# Patient Record
Sex: FEMALE | URBAN - METROPOLITAN AREA
[De-identification: names, ages, dates, MRNs, and addresses within clinical notes are randomized per-mention and may not be internally consistent; named-entity substitution may affect disease eponyms.]

---

## 2017-11-10 ENCOUNTER — MEDICAL CORRESPONDENCE (OUTPATIENT)
Dept: HEALTH INFORMATION MANAGEMENT | Facility: CLINIC | Age: 47
End: 2017-11-10

## 2017-11-10 ENCOUNTER — TRANSFERRED RECORDS (OUTPATIENT)
Dept: HEALTH INFORMATION MANAGEMENT | Facility: CLINIC | Age: 47
End: 2017-11-10

## 2017-11-16 ENCOUNTER — OFFICE VISIT (OUTPATIENT)
Dept: OBGYN | Facility: CLINIC | Age: 47
End: 2017-11-16
Attending: NURSE PRACTITIONER
Payer: COMMERCIAL

## 2017-11-16 VITALS
HEIGHT: 67 IN | WEIGHT: 145 LBS | HEART RATE: 66 BPM | BODY MASS INDEX: 22.76 KG/M2 | DIASTOLIC BLOOD PRESSURE: 68 MMHG | SYSTOLIC BLOOD PRESSURE: 102 MMHG

## 2017-11-16 DIAGNOSIS — F43.20 ADJUSTMENT DISORDER, UNSPECIFIED TYPE: ICD-10-CM

## 2017-11-16 DIAGNOSIS — E28.39 PREMATURE OVARIAN FAILURE: ICD-10-CM

## 2017-11-16 DIAGNOSIS — Z79.890 HORMONE REPLACEMENT THERAPY: ICD-10-CM

## 2017-11-16 DIAGNOSIS — Z76.89 ENCOUNTER TO ESTABLISH CARE WITH NEW DOCTOR: ICD-10-CM

## 2017-11-16 DIAGNOSIS — E28.39 PREMATURE OVARIAN FAILURE: Primary | ICD-10-CM

## 2017-11-16 LAB
ESTRADIOL SERPL-MCNC: <11 PG/ML
PROGEST SERPL-MCNC: 0.3 NG/ML
TSH SERPL DL<=0.005 MIU/L-ACNC: 1.18 MU/L (ref 0.4–4)

## 2017-11-16 PROCEDURE — 84144 ASSAY OF PROGESTERONE: CPT | Performed by: NURSE PRACTITIONER

## 2017-11-16 PROCEDURE — 36415 COLL VENOUS BLD VENIPUNCTURE: CPT | Performed by: NURSE PRACTITIONER

## 2017-11-16 PROCEDURE — 82670 ASSAY OF TOTAL ESTRADIOL: CPT | Performed by: NURSE PRACTITIONER

## 2017-11-16 PROCEDURE — 84443 ASSAY THYROID STIM HORMONE: CPT | Performed by: NURSE PRACTITIONER

## 2017-11-16 PROCEDURE — 99213 OFFICE O/P EST LOW 20 MIN: CPT | Mod: ZF

## 2017-11-16 RX ORDER — CHLORAL HYDRATE 500 MG
1 CAPSULE ORAL DAILY
COMMUNITY

## 2017-11-16 RX ORDER — ACETAMINOPHEN 160 MG
TABLET,DISINTEGRATING ORAL
COMMUNITY

## 2017-11-16 RX ORDER — MULTIVIT WITH MINERALS/LUTEIN
1000 TABLET ORAL DAILY
COMMUNITY

## 2017-11-16 RX ORDER — ESTRADIOL 10 UG/1
10 INSERT VAGINAL
Qty: 8 TABLET | Refills: 3 | Status: SHIPPED | OUTPATIENT
Start: 2017-11-16

## 2017-11-16 ASSESSMENT — PAIN SCALES - GENERAL: PAINLEVEL: NO PAIN (0)

## 2017-11-16 NOTE — PATIENT INSTRUCTIONS

## 2017-11-16 NOTE — LETTER
2017       RE: Radha Kebede  [address] [facility] [facility] [address] [facility] [address]     Dear Colleague,    Thank you for referring your patient, Radha Kebede, to the [facility] at [facility]. Please see a copy of my visit note below.        Progress Note    SUBJECTIVE:  Radha Kebede is a 47 year old, , with PMHx of premature ovarian failure who presents to [facility] health care.    Last women's health annual exam 10/17, [place]. PCP: Dr Magallanes  Last pap: 2.5 years ago per pt report. No hx of abnormal paps.    Last mammogram: first mammogram scheduled today at [place]  Last DXA scan: ~8 years ago per pt report. No hx of abnormal results.    Concerns today include:   1. Hx of premature ovarian failure at age 29. Left oopherectomy in  for hemorrhagic cyst. Right ovary congenitally nonfunctional. Denies post-menopausal bleeding.    2. Hormone replacement therapy. Pt is on compounded progesterone 100mg/ estriol 4mg which she tolerates well. She has not taken this for 2 months because the Rx ran out, and she feels well despite vaginal dryness that causes painful intercourse. Denies VMS. Pt is requesting for her hormone levels to be checked prior to restarting HT.     3. Interested in integrative therapies for PTSD from son's attempted suicide. In 2017, pt's 15 year old son hung himself, and pt found him and resuscitated him. He is alive and well now; no residual problems. Pt is coping well since the event. She uses horse therapy, and she and her  have sought therapy together which has been very helpful.    Menstrual History:  LMP: Post-menopausal. Denies vaginal bleeding.    Social:  to , supportive. Self-employed as . Has 2 adopted sons, age 15 and 16.  Exercises 5 days per week horseback riding x 60 min.  Nonsmoker.  ETOH: 2 drinks monthly.  No drug use.    Last    Lab Results   Component  Value Date    PAP NIL 2009     History of abnormal Pap smear: NO - age 30-65 PAP every 5 years with negative HPV co-testing recommended    Last No results found for: HPV16  Last No results found for: HPV18  Last No results found for: HRHPV    Mammogram current: pt has first mammogram scheduled for today.    Last Colonoscopy: Never    HISTORY:    No current outpatient prescriptions on file prior to visit.  No current facility-administered medications on file prior to visit.   Allergies   Allergen Reactions     Amoxicillin Other (See Comments)     Optic neuritis     Gluten Meal      Milk Protein Extract      Soy [Isoflavones]        There is no immunization history on file for this patient.    Obstetric History       T0      L0     SAB0   TAB0   Ectopic0   Multiple0   Live Births0      Past Medical History:   Diagnosis Date     In vitro fertilization [id number]    unsuccessful     Optic neuritis      Posttraumatic stress disorder      Premature ovarian failure      Vitamin D deficiency      Past Surgical History:   Procedure Laterality Date     HC REMOVAL OF OVARIAN CYST(S)       OOPHORECTOMY Left 2000     Family History   Problem Relation Age of Onset     Myocardial Infarction Maternal Grandfather 60     Coronary Artery Disease Maternal Grandfather      HEART DISEASE Father      Mitral valve repair     Arthritis Brother      Suicide Maternal Grandmother      Hypertension Paternal Grandmother      Emphysema Paternal Grandfather      Social History     Social History     Marital status:      Spouse name: N/A     Number of children: N/A     Years of education: N/A     Social History Main Topics     Smoking status: Not on file     Smokeless tobacco: Not on file     Alcohol use Not on file     Drug use: Not on file     Sexual activity: Not on file     Other Topics Concern     Not on file     Social History Narrative     No narrative on file       Review of Systems     Genitourinary:  Positive  "for vaginal dryness.   All other systems reviewed and are negative.      EXAM:  Blood pressure 102/68, pulse 66, height 1.702 m (5' 7\"), weight 65.8 kg (145 lb). Body mass index is 22.71 kg/(m^2).  General - pleasant female in no acute distress.  Skin - no suspicious lesions or rashes  EENT-  PERRLA, euthyroid with out palpable nodules  Neck - supple without lymphadenopathy.  Lungs - clear to auscultation bilaterally.  Heart - regular rate and rhythm without murmur.  Abdomen - soft, nontender, nondistended, no masses or organomegaly noted.  Musculoskeletal - no gross deformities.  Neurological - normal strength, sensation, and mental status.    ASSESSMENT:  Encounter Diagnoses   Name Primary?     Premature ovarian failure Yes     Hormone replacement therapy        PLAN:   Orders Placed This Encounter   Procedures     Estradiol     Progesterone     TSH - Reflex to FT4     Orders Placed This Encounter   Medications     COMPOUND CONTAINING CONTROLLED SUBSTANCE (CMPD RX) - PHARMACY TO MIX COMPOUNDED MEDICATION     Si capsule daily Progesterone 100 mg, estriol 4 MG      Additional teaching done at this visit regarding mental health.    Results via [employer].   Vaginal dryness: Vagifem rx sent. Discussed lubricants, referred to Elisa Crum, and Midlife education folder reviewed.  Continue compounded Rx as prescribed by Dr. Magallanes. Re-evaluate in 2-3 months,. Advised recommendations to continue HT until age 50-52  Discussed Reiki therapy to help release \"retained body trauma\" from son's suicide attempt,  given referral to  Noelle Torres.  RTC 2018 for 40 minute visit annual exam. Will address recommendations for DEXA scan    I, ALEKSEY Fraga, RN, CARSON Student, completed the PFSH and ROS. I then acted as a scribe for Lynette BYRD for the remainder of the visit.    ALEKSEY Fraga, RN  DNP student/ CARSON specialty    \"I agree with the PFSH and ROS as completed by the CARSON Student except for " changes made by me. The remainder of the encounter was performed by me and scribed by ALEKSEY FragaN, RN, WHNP Student. The scribed note accurately reflects my personal services and decisions made by me      Again, thank you for allowing me to participate in the care of your patient.      Sincerely,    PATRICIA Bazzi CNP

## 2017-11-16 NOTE — MR AVS SNAPSHOT
After Visit Summary   11/16/2017    Radha Kebede    MRN: [id number]           Patient Information     Date Of Birth          1970        Visit Information        Provider Department      11/16/2017 8:40 AM Lynette Infante APRN CNP [facility]        Today's Diagnoses     Premature ovarian failure    -  1    Hormone replacement therapy          Care Instructions      PREVENTIVE HEALTH RECOMMENDATIONS:   Most women need a yearly breast and pelvic exam.    A PAP screen, a test done DURING a pelvic exam, is NO longer recommended yearly.    March 2013, screening guidelines recommended by ACOG for PAP screen are:    1) First pap at age 21.    2) Pap every 3 years until age 30.    3) After age 30, pap every 3 years or Pap with HR HPV screen every 5 years until age 65.  4) Women do NOT need a vaginal Pap screen after a hysterectomy (surgical removal of the uterus) when they have not had cancer.    Exceptions:  1) Yearly pap if HIV+ or immunosuppressed secondary to organ transplant  2) ARNOLDO II-III continue routine screening for 20 years.    I encourage you continue looking for opportunities to choose a healthy lifestyle:       * Choose to eat a heart healthy diet. Check out the FOOD PLATE guidelines at: http://www.choosemyplate.gov/ for helpful hints on weight and cholesterol management.  Balance your caloric intake with exercise to maintain a BMI in the 22 to 26 range. For bone health: Eat calcium-rich foods like yogurt, broccoli or take chewable calcium pills (500 to 600 mg) twice a day with food.       * Exercise for at least an average of 30 minutes a day, 5 days of the week. This will help you control your weight, release stress, and help prevent disease.      * Take a Vitamin D3 supplement daily fall through spring and during summer unless you apag45-41' full body sun exposure to skin without sunscreen.      * DO wear sunscreen to prevent skin cancer after the first 15-30  minutes.      * Identify stressors in your life, find ways to release the stress, and, make time for yourself. PLEASE ask for help if mood changes last longer than two weeks.     * Limit alcohol to one drink per day.  No smoking.  Avoid second hand smoke. If you smoke, ask for help to stop.       *  If you are in a sexual relationship, talk with your partner about possible infection risks and take action to protect yourself from exposure to a sexual infection.    Please request an infection screen for STIs (sexually transmitted infections) if you are less than age 26 OR believe that you may be at risk.     Get a flu shot each year. Get a tetanus shot every 10 years. EVERYONE needs a pertussis (Whooping cough) booster.    See your dentist twice a year for an exam and preventive care cleaning.     Consider the following screen tests:    1) cholesterol test every 5 years.     2) yearly mammogram after age 40 unless you have identified risks.    3) colonoscopy every 10 years after age 50 unless you have identified risks.    4) diabetes blood test screening if you are at risk for diabetes.      Additional information that you may also find helpful:  The Internet now gives us access to LOTS of information -- some of it helpful, research documented and also plenty of harmful, anecdotal information that may not pertain to your situtaion. Consider visiting the following websites for accurate health information:    www.vitamindcouncil.org/ : Info and ongoing research re Vitamin D    www.fairview.org : Up to date and easily searchable information on multiple topics.    www.medlineplus.gov : medication info, interactive tutorials, watch real surgeries online    www.cdc.gov : public health info, travel advisories, epidemics (H1N1)    www.marcela/std.org: current research re diagnosis, treatment and prevention of sexually contacted infections.    www.health.[facility].[place] : [place] dept of heatlh, public health issues in [place],  N1N1    www.familydoctor.org : good info from the Academy of Family Physicians    Go to lab for blood work. Results will be communicated via Sedimap.   Continue compunded Rx as prescribed by Dr. Magallanes.   Body work to release trauma with Noelle Torres: Call [phone] to schedule.  Return to clinic in 2018 for 40 minute visit annual exam.                 Follow-ups after your visit        Follow-up notes from your care team     Return in 1 year (on 2018) for Preventative Health Visit.      Future tests that were ordered for you today     Open Future Orders        Priority Expected Expires Ordered    Estradiol Routine  2018    Progesterone Routine  2018    TSH - Reflex to FT4 Routine  2018            Who to contact     Please call your clinic at [phone] to:    Ask questions about your health    Make or cancel appointments    Discuss your medicines    Learn about your test results    Speak to your doctor   If you have compliments or concerns about an experience at your clinic, or if you wish to file a complaint, please contact [facility] Patient Relations at [phone] or email us at [email or web address]         Additional Information About Your Visit        Spot formerly PlacePopharDetectent Information     Sedimap is an electronic gateway that provides easy, online access to your medical records. With Sedimap, you can request a clinic appointment, read your test results, renew a prescription or communicate with your care team.     To sign up for Wantfult visit the website at [email or web address]   You will be asked to enter the access code listed below, as well as some personal information. Please follow the directions to create your username and password.     Your access code is: [email or web address]  Expires: 2018  6:31 AM     Your access code will  in 90 days. If you need help or a new code, please contact your  "[facility] or call [phone] for assistance.        Care EveryWhere ID     This is your Care EveryWhere ID. This could be used by other organizations to access your [facility] medical records  [id number]        Your Vitals Were     Pulse Height BMI (Body Mass Index)             66 1.702 m (5' 7\") 22.71 kg/m2          Blood Pressure from Last 3 Encounters:   11/16/17 102/68    Weight from Last 3 Encounters:   11/16/17 65.8 kg (145 lb)               Primary Care Provider    None Specified       No primary provider on file.        Equal Access to Services     [facility]: Hadii meagan altman hadasho Soomaali, waaxda luqadaha, qaybta kaalmada thalia, willa miller . So [facility] [phone].    ATENCIÓN: Si habla español, tiene a cameron disposición servicios gratuitos de asistencia lingüística. Llame al [phone].    We comply with applicable federal civil rights laws and [place] laws. We do not discriminate on the basis of race, color, national origin, age, disability, sex, sexual orientation, or gender identity.            Thank you!     Thank you for choosing [facility]  for your care. Our goal is always to provide you with excellent care. Hearing back from our patients is one way we can continue to improve our services. Please take a few minutes to complete the written survey that you may receive in the mail after your visit with us. Thank you!             Your Updated Medication List - Protect others around you: Learn how to safely use, store and throw away your medicines at www.disposemymeds.org.          This list is accurate as of: 11/16/17 10:01 AM.  Always use your most recent med list.                   Brand Name Dispense Instructions for use Diagnosis    COMPOUND CONTAINING CONTROLLED SUBSTANCE - PHARMACY TO MIX COMPOUNDED MEDICATION    CMPD RX     1 capsule daily Progesterone 100 mg, estriol 4 MG          "

## 2017-11-16 NOTE — PROGRESS NOTES
Progress Note    SUBJECTIVE:  Radha Kebede is a 47 year old, , with PMHx of premature ovarian failure who presents to [facility].    Last women's health annual exam 10/17, [place]. PCP: Dr Magallanes  Last pap: 2.5 years ago per pt report. No hx of abnormal paps.    Last mammogram: first mammogram scheduled today at [place]  Last DXA scan: ~8 years ago per pt report. No hx of abnormal results.    Concerns today include:   1. Hx of premature ovarian failure at age 29. Left oopherectomy in  for hemorrhagic cyst. Right ovary congenitally nonfunctional. Denies post-menopausal bleeding.    2. Hormone replacement therapy. Pt is on compounded progesterone 100mg/ estriol 4mg which she tolerates well. She has not taken this for 2 months because the Rx ran out, and she feels well despite vaginal dryness that causes painful intercourse. Denies VMS. Pt is requesting for her hormone levels to be checked prior to restarting HT.     3. Interested in integrative therapies for PTSD from son's attempted suicide. In 2017, pt's 15 year old son hung himself, and pt found him and resuscitated him. He is alive and well now; no residual problems. Pt is coping well since the event. She uses horse therapy, and she and her  have sought therapy together which has been very helpful.    Menstrual History:  LMP: Post-menopausal. Denies vaginal bleeding.    Social:  to , supportive. Self-employed as . Has 2 adopted sons, age 15 and 16.  Exercises 5 days per week horseback riding x 60 min.  Nonsmoker.  ETOH: 2 drinks monthly.  No drug use.    Last    Lab Results   Component Value Date    PAP NIL 2009     History of abnormal Pap smear: NO - age 30-65 PAP every 5 years with negative HPV co-testing recommended    Last No results found for: HPV16  Last No results found for: HPV18  Last No results found for: HRHPV    Mammogram current: pt has first mammogram  "scheduled for today.    Last Colonoscopy: Never    HISTORY:    No current outpatient prescriptions on file prior to visit.  No current facility-administered medications on file prior to visit.   Allergies   Allergen Reactions     Amoxicillin Other (See Comments)     Optic neuritis     Gluten Meal      Milk Protein Extract      Soy [Isoflavones]        There is no immunization history on file for this patient.    Obstetric History       T0      L0     SAB0   TAB0   Ectopic0   Multiple0   Live Births0      Past Medical History:   Diagnosis Date     In vitro fertilization [id number]    unsuccessful     Optic neuritis      Posttraumatic stress disorder      Premature ovarian failure      Vitamin D deficiency      Past Surgical History:   Procedure Laterality Date     HC REMOVAL OF OVARIAN CYST(S)       OOPHORECTOMY Left 2000     Family History   Problem Relation Age of Onset     Myocardial Infarction Maternal Grandfather 60     Coronary Artery Disease Maternal Grandfather      HEART DISEASE Father      Mitral valve repair     Arthritis Brother      Suicide Maternal Grandmother      Hypertension Paternal Grandmother      Emphysema Paternal Grandfather      Social History     Social History     Marital status:      Spouse name: N/A     Number of children: N/A     Years of education: N/A     Social History Main Topics     Smoking status: Not on file     Smokeless tobacco: Not on file     Alcohol use Not on file     Drug use: Not on file     Sexual activity: Not on file     Other Topics Concern     Not on file     Social History Narrative     No narrative on file       Review of Systems     Genitourinary:  Positive for vaginal dryness.   All other systems reviewed and are negative.      EXAM:  Blood pressure 102/68, pulse 66, height 1.702 m (5' 7\"), weight 65.8 kg (145 lb). Body mass index is 22.71 kg/(m^2).  General - pleasant female in no acute distress.  Skin - no suspicious lesions or " "rashes  EENT-  PERRLA, euthyroid with out palpable nodules  Neck - supple without lymphadenopathy.  Lungs - clear to auscultation bilaterally.  Heart - regular rate and rhythm without murmur.  Abdomen - soft, nontender, nondistended, no masses or organomegaly noted.  Musculoskeletal - no gross deformities.  Neurological - normal strength, sensation, and mental status.    ASSESSMENT:  Encounter Diagnoses   Name Primary?     Premature ovarian failure Yes     Hormone replacement therapy        PLAN:   Orders Placed This Encounter   Procedures     Estradiol     Progesterone     TSH - Reflex to FT4     Orders Placed This Encounter   Medications     COMPOUND CONTAINING CONTROLLED SUBSTANCE (CMPD RX) - PHARMACY TO MIX COMPOUNDED MEDICATION     Si capsule daily Progesterone 100 mg, estriol 4 MG      Additional teaching done at this visit regarding mental health.    Results via [employer].   Vaginal dryness: Vagifem rx sent. Discussed lubricants, referred to Elisa Crum, and Midlife education folder reviewed.  Continue compounded Rx as prescribed by Dr. Magallanes. Re-evaluate in 2-3 months,. Advised recommendations to continue HT until age 50-52  Discussed Reiki therapy to help release \"retained body trauma\" from son's suicide attempt,  given referral to  Noelle Torres.  RTC 2018 for 40 minute visit annual exam. Will address recommendations for DEXA scan    I, ALEKSEY Fraga, RN, CARSON Student, completed the PFSH and ROS. I then acted as a scribe for Lynette BYRD for the remainder of the visit.    ALEKSEY Fraga, RN  DNP student/ CARSON specialty    \"I agree with the PFSH and ROS as completed by the CARSON Student except for changes made by me. The remainder of the encounter was performed by me and scribed by ALEKSEY Fraga, RN, CARSON Student. The scribed note accurately reflects my personal services and decisions made by me    Lynette BYRD      "

## 2017-11-28 NOTE — PROGRESS NOTES
Dear Radha,    Your thyroid test was normal. The hormone results are within expected ranges for HT following premature menopause.    Sincerely,  Lynette BYRD

## 2018-02-21 ENCOUNTER — OFFICE VISIT (OUTPATIENT)
Dept: OBGYN | Facility: CLINIC | Age: 48
End: 2018-02-21
Attending: NURSE PRACTITIONER
Payer: COMMERCIAL

## 2018-02-21 VITALS
WEIGHT: 149 LBS | BODY MASS INDEX: 23.39 KG/M2 | HEART RATE: 71 BPM | HEIGHT: 67 IN | DIASTOLIC BLOOD PRESSURE: 74 MMHG | SYSTOLIC BLOOD PRESSURE: 111 MMHG

## 2018-02-21 DIAGNOSIS — N95.1 SYMPTOMATIC MENOPAUSAL OR FEMALE CLIMACTERIC STATES: Primary | ICD-10-CM

## 2018-02-21 DIAGNOSIS — N95.1 VAGINAL DRYNESS, MENOPAUSAL: ICD-10-CM

## 2018-02-21 ASSESSMENT — ENCOUNTER SYMPTOMS
DYSURIA: 0
FEVER: 0
BLOATING: 0
CHILLS: 0
INSOMNIA: 0
ABDOMINAL PAIN: 0

## 2018-02-21 ASSESSMENT — PAIN SCALES - GENERAL: PAINLEVEL: NO PAIN (0)

## 2018-02-21 NOTE — PROGRESS NOTES
Progress Note    SUBJECTIVE:  Radha Kebede is an 47 year old, , who requests follow up HT. She has been receiving HT through an integrative provider, who is retiring and Dr. Magallanes ([place], [place]). She is wanting to establish care here at [facility] for HT and gynecologic care, but she will continue primary care with Dr. Magallanes. Last gyn exam with Dr Magallanes 1.5 years ago with nl pap per patient.    Radha reports feeling well overall; although has a lot of stress in her life, especially with her 16-year-old son; however, she reports being able to manage it effectively with self-care.    Concerns today include:   1. POI at age 29 for which she is taking 100mg progesterone/4mg estriol once day and is requesting a prescription refill. She denies hot flashes, night sweats, fatigue, and difficulty sleeping. Patient states she is on 4mg of estriol as a neuroprotective treatment due to MS -like symptoms 5 years ago that proved not to be MS. She is seeing a functional medicine MD for this issue. She takes 100mg of progesterone daily.    2. Vaginal dryness that worsened while briefly off HT, but improved upon re-starting HT in 2017. She reports struggling to remember to take this medication twice weekly, would prefer an as needed or once weekly vaginal cream. Radha reports using lubrication as needed for insertional dyspareunia.     Menstrual History:   Denies any post-menstrual vaginal bleeding.   No flowsheet data found.    Last    Lab Results   Component Value Date    PAP NIL 2009     HISTORY:    Current Outpatient Prescriptions on File Prior to Visit:  COMPOUND CONTAINING CONTROLLED SUBSTANCE (CMPD RX) - PHARMACY TO MIX COMPOUNDED MEDICATION 1 capsule daily Progesterone 100 mg, estriol 4 MG    estradiol (VAGIFEM) 10 MCG TABS vaginal tablet Place 1 tablet (10 mcg) vaginally twice a week   fish oil-omega-3 fatty acids 1000 MG capsule Take 1 g by mouth daily Nordic  naturalDHA   Nutritional Supplements (NUTRITIONAL SUPPLEMENT PO) Take 5,000 mg by mouth MTH Folate/ Vitamin B12   Nutritional Supplements (NUTRITIONAL SUPPLEMENT PO) Take 1 capsule by mouth MRF 2   Multiple Vitamins-Minerals (MULTIVITAMIN ADULT PO)    Probiotic Product (PROBIOTIC DAILY PO) Take 1 capsule by mouth daily   Cholecalciferol (VITAMIN D3) 2000 UNITS CAPS    Nutritional Supplements (NUTRITIONAL SUPPLEMENT PO) Take 200 mg by mouth daily S-acetyl Glutathione   MAGNESIUM MALATE PO Take 800 mg by mouth daily   ascorbic acid (VITAMIN C) 1000 MG TABS Take 1,000 mg by mouth daily     No current facility-administered medications on file prior to visit.   Allergies   Allergen Reactions     Amoxicillin Other (See Comments)     Optic neuritis     Gluten Meal      Milk Protein Extract      Soy [Isoflavones]        There is no immunization history on file for this patient.    Obstetric History       T0      L0     SAB0   TAB0   Ectopic0   Multiple0   Live Births0      Past Medical History:   Diagnosis Date     In vitro fertilization [id number]    unsuccessful     Optic neuritis      Posttraumatic stress disorder      Premature ovarian failure      Vitamin D deficiency      Past Surgical History:   Procedure Laterality Date     HC REMOVAL OF OVARIAN CYST(S)       OOPHORECTOMY Left      Family History   Problem Relation Age of Onset     Myocardial Infarction Maternal Grandfather 60     Coronary Artery Disease Maternal Grandfather      HEART DISEASE Father      Mitral valve repair     Arthritis Brother      Suicide Maternal Grandmother      Hypertension Paternal Grandmother      Emphysema Paternal Grandfather      Social History     Social History     Marital status:      Spouse name: N/A     Number of children: N/A     Years of education: N/A     Occupational History           Social History Main Topics     Smoking status: Never Smoker     Smokeless tobacco: Never Used      "Alcohol use No      Comment: 1-2 month      Drug use: No     Sexual activity: Yes     Partners: Male     Other Topics Concern     Not on file     Social History Narrative    How much exercise per week? 4 days leasing a horse     How much calcium per day? Supplement       How much caffeine per day? 2 cups coffee    How much vitamin D per day? supplement    Do you/your family wear seatbelts?  Yes    Do you/your family use safety helmets? Yes    Do you/your family use sunscreen? Sometimes     Do you/your family keep firearms in the home? No    Do you/your family have a smoke detector(s)? Yes        November 16, 2017 Stefanie Caba LPN           Review of Systems     Constitutional:  Negative for fever and chills.   Gastrointestinal:  Negative for abdominal pain, bloating and change in stool.   Genitourinary:  Positive for vaginal dryness. Negative for bladder incontinence, dysuria, urgency and vaginal discharge.       EXAM:  Blood pressure 111/74, pulse 71, height 1.702 m (5' 7\"), weight 67.6 kg (149 lb). There is no height or weight on file to calculate BMI.  Constitutional: Well appearing woman in no acute distress, appropriately groomed   Oriented to time and place, engaged and interactive   Psychological: Appropriate mood  Eyes symmetrical, sclera clear and white, conjunctiva pink and moist   Mouth: moist mucous membranes, no visible dental caries   Skin: warm and dry, no visible rashes or lesions  Neuro: normal gait, no visible tremor   Musculoskeletal: Full ROM, no muscular weakness visible   40 minutes total time was spent in direct contact with patient and > 50% of the time in patient education and coordination of care.     ASSESSMENT:  Encounter Diagnoses   Name Primary?     Symptomatic menopausal or female climacteric states Yes     Vaginal dryness, menopausal         PLAN:     Orders Placed This Encounter   Medications     estradiol (ESTRING) 2 MG vaginal ring     Sig: Place 1 each (2 mg) vaginally every 3 " "months     Dispense:  1 each     Refill:  3     Continue with Rx 4mg estriol/10 mg progesterone orally on a daily basis. Compounded through Dr Magallanes as discussed.  Rx for Estring sent to [facility] to treat vaginal dryness. Patient declines a change in estriol.  Return in the fall for full well woman visit and medication assessment.  Patient to schedule Dexascan through Dr Magallanes soon.    I, Vidhya BYRD Student, completed the PFSH and ROS. I then acted as a scribe for Lynette BYRD for the remainder of the visit.  Vidhya BYRD Student.     \"I agree with the PFSH and ROS as completed by the CARSON Student, Vidhya Blanco  except for changes made by me. The remainder of the encounter was performed by me and scribed by the Vidhya Blanco. The scribed note accurately reflects my personal services and decisions made by me.      Lynette BYRD              "

## 2018-02-21 NOTE — PATIENT INSTRUCTIONS
Continue with Rx through Dr Magallanes as discussed  Rx for Estring sent to [facility]  Return in the fall for full  well woman visit and medication assessment

## 2025-07-15 NOTE — LETTER
2018       RE: Radha Kebede  [address] [address]     Dear Colleague,    Thank you for referring your patient, Radha Kebede, to the [facility] at [facility]. Please see a copy of my visit note below.          Progress Note    SUBJECTIVE:  Radha Kebede is an 47 year old, , who requests follow up HT. She has been receiving HT through an integrative provider, who is retiring and Dr. Magallanes ([place], [place]). She is wanting to establish care here at [facility] for HT and gynecologic care, but she will continue primary care with Dr. Magallanes. Last gyn exam with Dr Magallanes 1.5 years ago with nl pap per patient.    Radha reports feeling well overall; although has a lot of stress in her life, especially with her 16-year-old son; however, she reports being able to manage it effectively with self-care.    Concerns today include:   1. POI at age 29 for which she is taking 100mg progesterone/4mg estriol once day and is requesting a prescription refill. She denies hot flashes, night sweats, fatigue, and difficulty sleeping. Patient states she is on 4mg of estriol as a neuroprotective treatment due to MS -like symptoms 5 years ago that proved not to be MS. She is seeing a functional medicine MD for this issue. She takes 100mg of progesterone daily.    2. Vaginal dryness that worsened while briefly off HT, but improved upon re-starting HT in 2017. She reports struggling to remember to take this medication twice weekly, would prefer an as needed or once weekly vaginal cream. Radha reports using lubrication as needed for insertional dyspareunia.     Menstrual History:   Denies any post-menstrual vaginal bleeding.   No flowsheet data found.    Last    Lab Results   Component Value Date    PAP NIL 2009     HISTORY:    Current Outpatient Prescriptions on File Prior to Visit:  COMPOUND CONTAINING CONTROLLED  SUBSTANCE (CMPD RX) - PHARMACY TO MIX COMPOUNDED MEDICATION 1 capsule daily Progesterone 100 mg, estriol 4 MG    estradiol (VAGIFEM) 10 MCG TABS vaginal tablet Place 1 tablet (10 mcg) vaginally twice a week   fish oil-omega-3 fatty acids 1000 MG capsule Take 1 g by mouth daily Nordic naturalDHA   Nutritional Supplements (NUTRITIONAL SUPPLEMENT PO) Take 5,000 mg by mouth MTH Folate/ Vitamin B12   Nutritional Supplements (NUTRITIONAL SUPPLEMENT PO) Take 1 capsule by mouth MRF 2   Multiple Vitamins-Minerals (MULTIVITAMIN ADULT PO)    Probiotic Product (PROBIOTIC DAILY PO) Take 1 capsule by mouth daily   Cholecalciferol (VITAMIN D3) 2000 UNITS CAPS    Nutritional Supplements (NUTRITIONAL SUPPLEMENT PO) Take 200 mg by mouth daily S-acetyl Glutathione   MAGNESIUM MALATE PO Take 800 mg by mouth daily   ascorbic acid (VITAMIN C) 1000 MG TABS Take 1,000 mg by mouth daily     No current facility-administered medications on file prior to visit.   Allergies   Allergen Reactions     Amoxicillin Other (See Comments)     Optic neuritis     Gluten Meal      Milk Protein Extract      Soy [Isoflavones]        There is no immunization history on file for this patient.    Obstetric History       T0      L0     SAB0   TAB0   Ectopic0   Multiple0   Live Births0      Past Medical History:   Diagnosis Date     In vitro fertilization [id number]    unsuccessful     Optic neuritis      Posttraumatic stress disorder      Premature ovarian failure      Vitamin D deficiency      Past Surgical History:   Procedure Laterality Date     HC REMOVAL OF OVARIAN CYST(S)       OOPHORECTOMY Left      Family History   Problem Relation Age of Onset     Myocardial Infarction Maternal Grandfather 60     Coronary Artery Disease Maternal Grandfather      HEART DISEASE Father      Mitral valve repair     Arthritis Brother      Suicide Maternal Grandmother      Hypertension Paternal Grandmother      Emphysema Paternal Grandfather      Social  "History     Social History     Marital status:      Spouse name: N/A     Number of children: N/A     Years of education: N/A     Occupational History           Social History Main Topics     Smoking status: Never Smoker     Smokeless tobacco: Never Used     Alcohol use No      Comment: 1-2 month      Drug use: No     Sexual activity: Yes     Partners: Male     Other Topics Concern     Not on file     Social History Narrative    How much exercise per week? 4 days leasing a horse     How much calcium per day? Supplement       How much caffeine per day? 2 cups coffee    How much vitamin D per day? supplement    Do you/your family wear seatbelts?  Yes    Do you/your family use safety helmets? Yes    Do you/your family use sunscreen? Sometimes     Do you/your family keep firearms in the home? No    Do you/your family have a smoke detector(s)? Yes        November 16, 2017 Stefanie Caba LPN           Review of Systems     Constitutional:  Negative for fever and chills.   Gastrointestinal:  Negative for abdominal pain, bloating and change in stool.   Genitourinary:  Positive for vaginal dryness. Negative for bladder incontinence, dysuria, urgency and vaginal discharge.       EXAM:  Blood pressure 111/74, pulse 71, height 1.702 m (5' 7\"), weight 67.6 kg (149 lb). There is no height or weight on file to calculate BMI.  Constitutional: Well appearing woman in no acute distress, appropriately groomed   Oriented to time and place, engaged and interactive   Psychological: Appropriate mood  Eyes symmetrical, sclera clear and white, conjunctiva pink and moist   Mouth: moist mucous membranes, no visible dental caries   Skin: warm and dry, no visible rashes or lesions  Neuro: normal gait, no visible tremor   Musculoskeletal: Full ROM, no muscular weakness visible   40 minutes total time was spent in direct contact with patient and > 50% of the time in patient education and coordination of care. " "    ASSESSMENT:  Encounter Diagnoses   Name Primary?     Symptomatic menopausal or female climacteric states Yes     Vaginal dryness, menopausal         PLAN:     Orders Placed This Encounter   Medications     estradiol (ESTRING) 2 MG vaginal ring     Sig: Place 1 each (2 mg) vaginally every 3 months     Dispense:  1 each     Refill:  3     Continue with Rx 4mg estriol/10 mg progesterone orally on a daily basis. Compounded through Dr Magallanes as discussed.  Rx for Estring sent to [facility] to treat vaginal dryness. Patient declines a change in estriol.  Return in the fall for full well woman visit and medication assessment.  Patient to schedule Dexascan through Dr Elpidio dunham.    I, Vidhya BYRD Student, completed the PFSH and ROS. I then acted as a scribe for Lynette BYRD for the remainder of the visit.  Vidhya BYRD Student.     \"I agree with the PFSH and ROS as completed by the WHLORENZA Student, Vidhya Blanco  except for changes made by me. The remainder of the encounter was performed by me and scribed by the Vidhya Blanco. The scribed note accurately reflects my personal services and decisions made by me.      Again, thank you for allowing me to participate in the care of your patient.      Sincerely,    Lynette Infante, PATRICIA CNP      " Standing/Walking/Toileting/Moving from bed to stretcher